# Patient Record
Sex: FEMALE | Race: WHITE | URBAN - NONMETROPOLITAN AREA
[De-identification: names, ages, dates, MRNs, and addresses within clinical notes are randomized per-mention and may not be internally consistent; named-entity substitution may affect disease eponyms.]

---

## 2022-01-25 ENCOUNTER — NEW PATIENT (OUTPATIENT)
Dept: URBAN - NONMETROPOLITAN AREA CLINIC 4 | Facility: CLINIC | Age: 67
End: 2022-01-25

## 2022-01-25 DIAGNOSIS — H25.13: ICD-10-CM

## 2022-01-25 DIAGNOSIS — H52.03: ICD-10-CM

## 2022-01-25 PROCEDURE — 92004 COMPRE OPH EXAM NEW PT 1/>: CPT

## 2022-01-25 PROCEDURE — 92015 DETERMINE REFRACTIVE STATE: CPT

## 2022-01-25 ASSESSMENT — VISUAL ACUITY
OD_PH: 20/25-2
OD_SC: 20/70-1
OS_PH: 20/40
OS_SC: 20/200+1

## 2022-01-25 ASSESSMENT — TONOMETRY
OD_IOP_MMHG: 14
OS_IOP_MMHG: 15

## 2022-01-25 NOTE — PATIENT DISCUSSION
DIscussed findings w/patient.  Refer to [address] [place] [address] for n/a cat eval.  Continue to monitor as per [address] [place] [address].

## 2022-03-11 ENCOUNTER — CONSULTATION/EVALUATION (OUTPATIENT)
Dept: URBAN - NONMETROPOLITAN AREA CLINIC 4 | Facility: CLINIC | Age: 67
End: 2022-03-11

## 2022-03-11 DIAGNOSIS — H25.13: ICD-10-CM

## 2022-03-11 PROCEDURE — 99214 OFFICE O/P EST MOD 30 MIN: CPT

## 2022-03-11 ASSESSMENT — VISUAL ACUITY
OD_PH: 20/40
OD_CC: 20/40
OS_CC: 20/70
OS_SC: 20/60
OS_PH: 20/50-1
OS_BAT: 20/80
OS_AM: 20/30
OD_PAM: 20/25
OD_SC: 20/50-1

## 2022-03-11 ASSESSMENT — TONOMETRY
OD_IOP_MMHG: 15
OS_IOP_MMHG: 14

## 2022-03-11 NOTE — PATIENT DISCUSSION
(Surgical) Visually Significant (secondary to glare), discussed the risks, benefits, alternatives, and limitations of cataract surgery. The patient stated a full understanding and a desire to proceed with the procedure. The patient will need to return for pre-op appointment with cataract measurements and to have any additional questions answered, and start pre-operative eye drops as directed. Pt elects to proceed with CE OD first and OS after. Qualifies for Toric OU, discussed benefits with patient & need for reading glasses. Discussed Stand/Trad & need for bifocals. Discussed lenSx vs Trad. Pt states she will schedule surgery & make lens decision & inform surgery dept prior to surgery. Undecided today. Dextenza OU +.

## 2022-04-18 ENCOUNTER — PRE-OP/H&P (OUTPATIENT)
Dept: URBAN - NONMETROPOLITAN AREA CLINIC 4 | Facility: CLINIC | Age: 67
End: 2022-04-18

## 2022-04-18 VITALS
DIASTOLIC BLOOD PRESSURE: 83 MMHG | WEIGHT: 140 LBS | HEIGHT: 65 IN | SYSTOLIC BLOOD PRESSURE: 123 MMHG | BODY MASS INDEX: 23.32 KG/M2 | HEART RATE: 66 BPM

## 2022-04-18 DIAGNOSIS — H25.13: ICD-10-CM

## 2022-04-18 PROCEDURE — 99499 UNLISTED E&M SERVICE: CPT

## 2022-04-26 ENCOUNTER — POST-OP (OUTPATIENT)
Dept: URBAN - NONMETROPOLITAN AREA CLINIC 4 | Facility: CLINIC | Age: 67
End: 2022-04-26

## 2022-04-26 DIAGNOSIS — Z96.1: ICD-10-CM

## 2022-04-26 PROCEDURE — 99024 POSTOP FOLLOW-UP VISIT: CPT

## 2022-04-26 ASSESSMENT — TONOMETRY
OD_IOP_MMHG: 16
OS_IOP_MMHG: 15

## 2022-04-26 ASSESSMENT — VISUAL ACUITY
OS_SC: 20/200
OS_PH: 20/50+2
OU_SC: 20/25
OD_SC: 20/25

## 2022-05-02 ENCOUNTER — POST OP/EVAL OF SECOND EYE (OUTPATIENT)
Dept: URBAN - NONMETROPOLITAN AREA CLINIC 4 | Facility: CLINIC | Age: 67
End: 2022-05-02

## 2022-05-02 VITALS
BODY MASS INDEX: 23.32 KG/M2 | HEART RATE: 59 BPM | RESPIRATION RATE: 17 BRPM | HEIGHT: 65 IN | WEIGHT: 140 LBS | SYSTOLIC BLOOD PRESSURE: 134 MMHG | DIASTOLIC BLOOD PRESSURE: 84 MMHG

## 2022-05-02 DIAGNOSIS — Z96.1: ICD-10-CM

## 2022-05-02 PROCEDURE — 99024 POSTOP FOLLOW-UP VISIT: CPT

## 2022-05-02 ASSESSMENT — TONOMETRY
OS_IOP_MMHG: 15
OD_IOP_MMHG: 14

## 2022-05-02 ASSESSMENT — VISUAL ACUITY
OS_BAT: 20/80
OS_PH: 20/50+1
OD_SC: 20/20
OS_SC: 20/400
OS_CC: 20/40
OS_AM: 20/20

## 2022-05-09 ENCOUNTER — SURGERY/PROCEDURE (OUTPATIENT)
Dept: URBAN - METROPOLITAN AREA SURGERY 3 | Facility: SURGERY | Age: 67
End: 2022-05-09

## 2022-05-09 DIAGNOSIS — H25.12: ICD-10-CM

## 2022-05-09 PROCEDURE — 68841 INSJ RX ELUT IMPLT LAC CANAL: CPT

## 2022-05-09 PROCEDURE — 66984 XCAPSL CTRC RMVL W/O ECP: CPT

## 2022-05-10 ENCOUNTER — POST-OP (OUTPATIENT)
Dept: URBAN - NONMETROPOLITAN AREA CLINIC 4 | Facility: CLINIC | Age: 67
End: 2022-05-10

## 2022-05-10 DIAGNOSIS — Z96.1: ICD-10-CM

## 2022-05-10 PROCEDURE — 99024 POSTOP FOLLOW-UP VISIT: CPT

## 2022-05-10 ASSESSMENT — VISUAL ACUITY
OS_SC: 20/30+2
OD_SC: 20/20

## 2022-05-10 ASSESSMENT — TONOMETRY
OD_IOP_MMHG: 11
OS_IOP_MMHG: 13

## 2022-05-17 ENCOUNTER — POST-OP (OUTPATIENT)
Dept: URBAN - NONMETROPOLITAN AREA CLINIC 4 | Facility: CLINIC | Age: 67
End: 2022-05-17

## 2022-05-17 DIAGNOSIS — Z96.1: ICD-10-CM

## 2022-05-17 PROCEDURE — 99024 POSTOP FOLLOW-UP VISIT: CPT

## 2022-05-17 ASSESSMENT — VISUAL ACUITY
OS_SC: 20/20
OU_SC: 20/15-1
OD_SC: 20/20

## 2022-05-17 ASSESSMENT — TONOMETRY
OD_IOP_MMHG: 11
OS_IOP_MMHG: 12